# Patient Record
Sex: FEMALE | Race: WHITE | NOT HISPANIC OR LATINO | Employment: FULL TIME | URBAN - NONMETROPOLITAN AREA
[De-identification: names, ages, dates, MRNs, and addresses within clinical notes are randomized per-mention and may not be internally consistent; named-entity substitution may affect disease eponyms.]

---

## 2023-10-21 RX ORDER — PNV NO.95/FERROUS FUM/FOLIC AC 28MG-0.8MG
TABLET ORAL
COMMUNITY

## 2023-10-21 RX ORDER — IBUPROFEN 600 MG/1
600 TABLET ORAL EVERY 6 HOURS
COMMUNITY
End: 2023-10-24 | Stop reason: ALTCHOICE

## 2023-10-21 RX ORDER — NORETHINDRONE 0.35 MG/1
1 TABLET ORAL DAILY
COMMUNITY
Start: 2022-07-08 | End: 2023-10-24 | Stop reason: ALTCHOICE

## 2023-10-21 RX ORDER — DROSPIRENONE AND ETHINYL ESTRADIOL 0.03MG-3MG
1 KIT ORAL DAILY
COMMUNITY
End: 2023-10-24 | Stop reason: ALTCHOICE

## 2023-10-21 RX ORDER — ENOXAPARIN SODIUM 100 MG/ML
40 INJECTION SUBCUTANEOUS DAILY
COMMUNITY
End: 2023-10-24 | Stop reason: ALTCHOICE

## 2023-10-24 ENCOUNTER — OFFICE VISIT (OUTPATIENT)
Dept: OBSTETRICS AND GYNECOLOGY | Facility: CLINIC | Age: 33
End: 2023-10-24
Payer: COMMERCIAL

## 2023-10-24 VITALS
BODY MASS INDEX: 27.57 KG/M2 | HEIGHT: 63 IN | SYSTOLIC BLOOD PRESSURE: 116 MMHG | WEIGHT: 155.6 LBS | DIASTOLIC BLOOD PRESSURE: 64 MMHG

## 2023-10-24 DIAGNOSIS — Z32.01 POSITIVE URINE PREGNANCY TEST (HHS-HCC): ICD-10-CM

## 2023-10-24 DIAGNOSIS — N91.2 AMENORRHEA: Primary | ICD-10-CM

## 2023-10-24 LAB — PREGNANCY TEST URINE, POC: POSITIVE

## 2023-10-24 PROCEDURE — 99213 OFFICE O/P EST LOW 20 MIN: CPT | Performed by: OBSTETRICS & GYNECOLOGY

## 2023-10-24 PROCEDURE — 76817 TRANSVAGINAL US OBSTETRIC: CPT | Performed by: OBSTETRICS & GYNECOLOGY

## 2023-10-24 PROCEDURE — 81025 URINE PREGNANCY TEST: CPT | Performed by: OBSTETRICS & GYNECOLOGY

## 2023-10-24 PROCEDURE — 76801 OB US < 14 WKS SINGLE FETUS: CPT | Performed by: OBSTETRICS & GYNECOLOGY

## 2023-10-24 PROCEDURE — 1036F TOBACCO NON-USER: CPT | Performed by: OBSTETRICS & GYNECOLOGY

## 2023-10-24 NOTE — PROGRESS NOTES
Subjective   Patient ID [id number]   Caro Rogers is a 32 y.o. , who presents for evaluation regarding low menstrual flow since August.  She has some nausea.  Denies any vaginal bleeding or cramps.    Chief Complaint   Patient presents with    Amenorrhea     Patient is here due to amenorrhea. LMP: 2023. Patient c/o nausea. Patient denies any breast tenderness, cramps or vaginal bleeding.        OB History    Para Term  AB Living   1 1 1 0 0 1   SAB IAB Ectopic Multiple Live Births   0 0 0 0 1      # Outcome Date GA Lbr Ky/2nd Weight Sex Delivery Anes PTL Lv   1 Term 22 39w6d  3062 g F Vag-Spont   PIERCE          Review of Systems:   Constitutional: No fever or chills  Respiratory: No shortness of breath, or cough  Cardiovascular: No chest pain or syncope  Breasts: No masses, no nipple discharge  Gastrointestinal: No diarrhea, no abdominal pain  Genitourinary: No dysuria or frequency  Gynecology: Negative except as noted in history of present illness  All other: All other systems reviewed and negative for complaint    Objective   Physical Exam  Weight: 70.6 kg (155 lb 9.6 oz)  Expected Total Weight Gain: Could not be calculated   Pregravid BMI: Could not be calculated  BP: 116/64          OBGyn Exam  PHYSICAL EXAMINATION:  Well-developed, well nourished, in no acute distress, alert and oriented x three, is pleasant and cooperative.  HEENT: Clear. Pupils equal, round and reactive to light and accommodation. Extraocular muscles are intact. Oral mucosa pink without exudate.   NECK: No lymphadenopathy, no thyromegaly.  LUNGS: Clear bilaterally.  HEART: Regular rate and rhythm without murmurs.  ABDOMEN: Normoactive bowel sounds, soft and nontender, no guarding or rebound tenderness, no CVA tenderness.  EXTREMITIES: No clubbing, cyanosis or edema.  NEUROLOGIC:  Cranial nerves II-XII grossly intact.  :  Normal external female genitalia, normal vulva, normal vagina.  Vaginal ultrasound  shows a live intrauterine pregnancy at 8 weeks 4 days gestation which is consistent with LMP. EDC is Marlene 3, 2024.    Assessment/Plan   Problem List Items Addressed This Visit    None  Visit Diagnoses       Amenorrhea    -  Primary    Relevant Orders    US OB < 14 weeks early (Completed)    Positive urine pregnancy test        Relevant Orders    POC pregnancy, urine (Completed)            Follow up in 4 weeks for return new OB visit, cultures and prenatal labs.

## 2023-11-21 ENCOUNTER — INITIAL PRENATAL (OUTPATIENT)
Dept: OBSTETRICS AND GYNECOLOGY | Facility: CLINIC | Age: 33
End: 2023-11-21
Payer: COMMERCIAL

## 2023-11-21 ENCOUNTER — LAB (OUTPATIENT)
Dept: LAB | Facility: LAB | Age: 33
End: 2023-11-21
Payer: COMMERCIAL

## 2023-11-21 VITALS — BODY MASS INDEX: 28.13 KG/M2 | DIASTOLIC BLOOD PRESSURE: 72 MMHG | SYSTOLIC BLOOD PRESSURE: 122 MMHG | WEIGHT: 158.8 LBS

## 2023-11-21 DIAGNOSIS — Z3A.12 12 WEEKS GESTATION OF PREGNANCY (HHS-HCC): ICD-10-CM

## 2023-11-21 DIAGNOSIS — Z11.3 SCREEN FOR STD (SEXUALLY TRANSMITTED DISEASE): ICD-10-CM

## 2023-11-21 LAB
ABO GROUP (TYPE) IN BLOOD: NORMAL
ANTIBODY SCREEN: NORMAL
ERYTHROCYTE [DISTWIDTH] IN BLOOD BY AUTOMATED COUNT: 13.8 % (ref 11.5–14.5)
HCT VFR BLD AUTO: 40.6 % (ref 36–46)
HGB BLD-MCNC: 13.1 G/DL (ref 12–16)
MCH RBC QN AUTO: 28.8 PG (ref 26–34)
MCHC RBC AUTO-ENTMCNC: 32.3 G/DL (ref 32–36)
MCV RBC AUTO: 89 FL (ref 80–100)
NRBC BLD-RTO: 0 /100 WBCS (ref 0–0)
PLATELET # BLD AUTO: 235 X10*3/UL (ref 150–450)
RBC # BLD AUTO: 4.55 X10*6/UL (ref 4–5.2)
RH FACTOR (ANTIGEN D): NORMAL
WBC # BLD AUTO: 6.7 X10*3/UL (ref 4.4–11.3)

## 2023-11-21 PROCEDURE — 87086 URINE CULTURE/COLONY COUNT: CPT

## 2023-11-21 PROCEDURE — 86780 TREPONEMA PALLIDUM: CPT

## 2023-11-21 PROCEDURE — 87389 HIV-1 AG W/HIV-1&-2 AB AG IA: CPT

## 2023-11-21 PROCEDURE — 85027 COMPLETE CBC AUTOMATED: CPT

## 2023-11-21 PROCEDURE — 86900 BLOOD TYPING SEROLOGIC ABO: CPT

## 2023-11-21 PROCEDURE — 87340 HEPATITIS B SURFACE AG IA: CPT

## 2023-11-21 PROCEDURE — 0500F INITIAL PRENATAL CARE VISIT: CPT | Performed by: OBSTETRICS & GYNECOLOGY

## 2023-11-21 PROCEDURE — 36415 COLL VENOUS BLD VENIPUNCTURE: CPT

## 2023-11-21 PROCEDURE — 86850 RBC ANTIBODY SCREEN: CPT

## 2023-11-21 PROCEDURE — 86901 BLOOD TYPING SEROLOGIC RH(D): CPT

## 2023-11-21 PROCEDURE — 86317 IMMUNOASSAY INFECTIOUS AGENT: CPT

## 2023-11-21 PROCEDURE — 87800 DETECT AGNT MULT DNA DIREC: CPT

## 2023-11-21 NOTE — PROGRESS NOTES
Subjective   Patient ID [id number]   Caro Rogers is a 32 y.o.  at 12w1d with a working estimated date of delivery of 6/3/2024, by Last Menstrual Period who presents for an initial prenatal visit.     Chief Complaint   Patient presents with    Routine Prenatal Visit     Patient has no concerns at this time.          OB History    Para Term  AB Living   2 1 1 0 0 1   SAB IAB Ectopic Multiple Live Births   0 0 0 0 1      # Outcome Date GA Lbr Ky/2nd Weight Sex Delivery Anes PTL Lv   2 Current            1 Term 22 39w6d  3062 g F Vag-Spont   PIERCE          Review of Systems:   Constitutional: No fever or chills  Respiratory: No shortness of breath, or cough  Cardiovascular: No chest pain or syncope  Breasts: No masses, no nipple discharge  Gastrointestinal: No diarrhea, no abdominal pain  Genitourinary: No dysuria or frequency  Gynecology: Negative except as noted in history of present illness  All other: All other systems reviewed and negative for complaint    Objective   Physical Exam  Weight: 72 kg (158 lb 12.8 oz)  Expected Total Weight Gain: 7 kg (15 lb)-11.5 kg (25 lb)   Pregravid BMI: 27.46  BP: 122/72          OBGyn Exam  PHYSICAL EXAMINATION:  Well-developed, well nourished, in no acute distress, alert and oriented x three, is pleasant and cooperative.  HEENT: Clear. Pupils equal, round and reactive to light and accommodation. Extraocular muscles are intact. Oral mucosa pink without exudate.   NECK: No lymphadenopathy, no thyromegaly.  LUNGS: Clear bilaterally.  HEART: Regular rate and rhythm without murmurs.  ABDOMEN: Normoactive bowel sounds, soft and nontender, no guarding or rebound tenderness, no CVA tenderness.  EXTREMITIES: No clubbing, cyanosis or edema.  NEUROLOGIC:  Cranial nerves II-XII grossly intact.      Assessment/Plan   Problem List Items Addressed This Visit    None  Visit Diagnoses       12 weeks gestation of pregnancy        Relevant Orders    Urine Culture     CBC Anemia Panel With Reflex,Pregnancy    Syphilis Screen with Reflex    Hepatitis B Surface Antigen    HIV 1/2 Antigen/Antibody Screen with Reflex to Confirmation    Rubella Antibody, IgG    Type And Screen    Screen for STD (sexually transmitted disease)        Relevant Orders    C. Trachomatis / N. Gonorrhoeae, Amplified Detection            Prenatal Labs ordered    Follow up in 4 weeks for return OB visit.

## 2023-11-22 LAB
C TRACH RRNA SPEC QL NAA+PROBE: NEGATIVE
HBV SURFACE AG SERPL QL IA: NONREACTIVE
HIV 1+2 AB+HIV1 P24 AG SERPL QL IA: NONREACTIVE
N GONORRHOEA DNA SPEC QL PROBE+SIG AMP: NEGATIVE
REFLEX ADDED, ANEMIA PANEL: NORMAL
RUBV IGG SERPL IA-ACNC: 1.7 IA
RUBV IGG SERPL QL IA: POSITIVE
T PALLIDUM AB SER QL: NONREACTIVE

## 2023-11-23 LAB — BACTERIA UR CULT: NO GROWTH

## 2023-12-19 ENCOUNTER — ROUTINE PRENATAL (OUTPATIENT)
Dept: OBSTETRICS AND GYNECOLOGY | Facility: CLINIC | Age: 33
End: 2023-12-19
Payer: COMMERCIAL

## 2023-12-19 VITALS — SYSTOLIC BLOOD PRESSURE: 116 MMHG | DIASTOLIC BLOOD PRESSURE: 68 MMHG | BODY MASS INDEX: 29.19 KG/M2 | WEIGHT: 164.8 LBS

## 2023-12-19 DIAGNOSIS — Z3A.16 16 WEEKS GESTATION OF PREGNANCY (HHS-HCC): Primary | ICD-10-CM

## 2023-12-19 PROCEDURE — 0501F PRENATAL FLOW SHEET: CPT | Performed by: OBSTETRICS & GYNECOLOGY

## 2023-12-19 NOTE — PROGRESS NOTES
Subjective   Patient ID [id number]   Caro Rogers is a 32 y.o.  at 16w1d with a working estimated date of delivery of 6/3/2024, by Last Menstrual Period who presents for a routine prenatal visit. She denies vaginal bleeding, leakage of fluid, decreased fetal movements, or contractions.    Chief Complaint   Patient presents with    Routine Prenatal Visit     Patient has no concerns at this time. Urine is negative for glucose and protein. Patient declines quad screen.          Objective   Physical Exam  Weight: 74.8 kg (164 lb 12.8 oz)  Expected Total Weight Gain: 7 kg (15 lb)-11.5 kg (25 lb)   Pregravid BMI: 27.46  BP: 116/68  Fetal Heart Rate: 150 Fundal Height (cm): 16 cm    Prenatal Labs  Lab Results   Component Value Date    HGB 13.1 2023    HCT 40.6 2023    ABO O 2023    HEPBSAG Nonreactive 2023       Assessment/Plan   Problem List Items Addressed This Visit    None  Visit Diagnoses       16 weeks gestation of pregnancy    -  Primary    Relevant Orders    US OB 14+ weeks anatomy scan            Continue prenatal vitamin.  Labs reviewed.    Declines quad screen.  Follow up in 4 weeks for a routine prenatal visit.

## 2024-01-16 ENCOUNTER — ROUTINE PRENATAL (OUTPATIENT)
Dept: OBSTETRICS AND GYNECOLOGY | Facility: CLINIC | Age: 34
End: 2024-01-16
Payer: COMMERCIAL

## 2024-01-16 ENCOUNTER — HOSPITAL ENCOUNTER (OUTPATIENT)
Dept: RADIOLOGY | Facility: HOSPITAL | Age: 34
Discharge: HOME | End: 2024-01-16
Payer: COMMERCIAL

## 2024-01-16 VITALS — DIASTOLIC BLOOD PRESSURE: 66 MMHG | BODY MASS INDEX: 30.5 KG/M2 | SYSTOLIC BLOOD PRESSURE: 112 MMHG | WEIGHT: 172.2 LBS

## 2024-01-16 DIAGNOSIS — Z3A.16 16 WEEKS GESTATION OF PREGNANCY (HHS-HCC): ICD-10-CM

## 2024-01-16 DIAGNOSIS — Z3A.20 20 WEEKS GESTATION OF PREGNANCY (HHS-HCC): Primary | ICD-10-CM

## 2024-01-16 PROCEDURE — 76805 OB US >/= 14 WKS SNGL FETUS: CPT

## 2024-01-16 PROCEDURE — 0501F PRENATAL FLOW SHEET: CPT | Performed by: OBSTETRICS & GYNECOLOGY

## 2024-01-16 PROCEDURE — 76815 OB US LIMITED FETUS(S): CPT | Performed by: RADIOLOGY

## 2024-01-16 NOTE — PROGRESS NOTES
Subjective   Patient ID [id number]   Caro Rogers is a 33 y.o.  at 20w1d with a working estimated date of delivery of 6/3/2024, by Last Menstrual Period who presents for a routine prenatal visit. She denies vaginal bleeding, leakage of fluid, decreased fetal movements, or contractions.    Chief Complaint   Patient presents with    Routine Prenatal Visit     Patient has no concerns at this time. Urine is negative for glucose and protein.          Objective   Physical Exam  Weight: 78.1 kg (172 lb 3.2 oz)  Expected Total Weight Gain: 7 kg (15 lb)-11.5 kg (25 lb)   Pregravid BMI: 27.46  BP: 112/66  Fetal Heart Rate: 150 Fundal Height (cm): 20 cm    Prenatal Labs    Lab Results   Component Value Date    HGB 13.1 2023    HCT 40.6 2023    ABO O 2023    HEPBSAG Nonreactive 2023       Assessment/Plan   Problem List Items Addressed This Visit    None  Visit Diagnoses       20 weeks gestation of pregnancy    -  Primary            Continue prenatal vitamin.  Labs reviewed.    Follow up in 4 weeks for a routine prenatal visit.

## 2024-02-13 ENCOUNTER — ROUTINE PRENATAL (OUTPATIENT)
Dept: OBSTETRICS AND GYNECOLOGY | Facility: CLINIC | Age: 34
End: 2024-02-13
Payer: COMMERCIAL

## 2024-02-13 VITALS — DIASTOLIC BLOOD PRESSURE: 66 MMHG | SYSTOLIC BLOOD PRESSURE: 114 MMHG | BODY MASS INDEX: 31.57 KG/M2 | WEIGHT: 178.2 LBS

## 2024-02-13 DIAGNOSIS — Z3A.23 23 WEEKS GESTATION OF PREGNANCY (HHS-HCC): Primary | ICD-10-CM

## 2024-02-13 PROCEDURE — 0501F PRENATAL FLOW SHEET: CPT | Performed by: OBSTETRICS & GYNECOLOGY

## 2024-02-13 NOTE — PROGRESS NOTES
Subjective   Patient ID [id number]   Caro Rogers is a 33 y.o.  at 24w1d with a working estimated date of delivery of 6/3/2024, by Last Menstrual Period who presents for a routine prenatal visit. She denies vaginal bleeding, leakage of fluid, decreased fetal movements, or contractions.    Chief Complaint   Patient presents with    Routine Prenatal Visit     Patient has no concerns at this time. 1 hour GTT info given. Urine is negative for glucose and protein.          Objective   Physical Exam  Weight: 80.8 kg (178 lb 3.2 oz)  Expected Total Weight Gain: 7 kg (15 lb)-11.5 kg (25 lb)   Pregravid BMI: 27.46  BP: 114/66  Fetal Heart Rate: 146 Fundal Height (cm): 24 cm    Prenatal Labs    Lab Results   Component Value Date    HGB 13.1 2023    HCT 40.6 2023    ABO O 2023    HEPBSAG Nonreactive 2023       Assessment/Plan       Continue prenatal vitamin.  Labs reviewed.    Follow up in 4 weeks for a routine prenatal visit.

## 2024-03-12 ENCOUNTER — LAB (OUTPATIENT)
Dept: LAB | Facility: LAB | Age: 34
End: 2024-03-12
Payer: COMMERCIAL

## 2024-03-12 ENCOUNTER — ROUTINE PRENATAL (OUTPATIENT)
Dept: OBSTETRICS AND GYNECOLOGY | Facility: CLINIC | Age: 34
End: 2024-03-12
Payer: COMMERCIAL

## 2024-03-12 VITALS — SYSTOLIC BLOOD PRESSURE: 110 MMHG | WEIGHT: 180.8 LBS | BODY MASS INDEX: 32.03 KG/M2 | DIASTOLIC BLOOD PRESSURE: 64 MMHG

## 2024-03-12 DIAGNOSIS — O26.899 RH NEGATIVE STATE IN ANTEPARTUM PERIOD (HHS-HCC): ICD-10-CM

## 2024-03-12 DIAGNOSIS — Z67.91 RH NEGATIVE STATE IN ANTEPARTUM PERIOD (HHS-HCC): ICD-10-CM

## 2024-03-12 DIAGNOSIS — Z3A.28 28 WEEKS GESTATION OF PREGNANCY (HHS-HCC): Primary | ICD-10-CM

## 2024-03-12 DIAGNOSIS — Z3A.23 23 WEEKS GESTATION OF PREGNANCY (HHS-HCC): ICD-10-CM

## 2024-03-12 LAB
ABO GROUP (TYPE) IN BLOOD: NORMAL
ANTIBODY SCREEN: NORMAL
ERYTHROCYTE [DISTWIDTH] IN BLOOD BY AUTOMATED COUNT: 12.2 % (ref 11.5–14.5)
GLUCOSE 1H P 50 G GLC PO SERPL-MCNC: 92 MG/DL
HCT VFR BLD AUTO: 34 % (ref 36–46)
HGB BLD-MCNC: 11.2 G/DL (ref 12–16)
MCH RBC QN AUTO: 28.9 PG (ref 26–34)
MCHC RBC AUTO-ENTMCNC: 32.9 G/DL (ref 32–36)
MCV RBC AUTO: 88 FL (ref 80–100)
NRBC BLD-RTO: 0 /100 WBCS (ref 0–0)
PLATELET # BLD AUTO: 187 X10*3/UL (ref 150–450)
RBC # BLD AUTO: 3.88 X10*6/UL (ref 4–5.2)
REFLEX ADDED, ANEMIA PANEL: NORMAL
RH FACTOR (ANTIGEN D): NORMAL
WBC # BLD AUTO: 6.4 X10*3/UL (ref 4.4–11.3)

## 2024-03-12 PROCEDURE — 82947 ASSAY GLUCOSE BLOOD QUANT: CPT

## 2024-03-12 PROCEDURE — 85027 COMPLETE CBC AUTOMATED: CPT

## 2024-03-12 PROCEDURE — 36415 COLL VENOUS BLD VENIPUNCTURE: CPT

## 2024-03-12 PROCEDURE — 96372 THER/PROPH/DIAG INJ SC/IM: CPT | Performed by: OBSTETRICS & GYNECOLOGY

## 2024-03-12 PROCEDURE — 0501F PRENATAL FLOW SHEET: CPT | Performed by: OBSTETRICS & GYNECOLOGY

## 2024-03-12 PROCEDURE — 86900 BLOOD TYPING SEROLOGIC ABO: CPT

## 2024-03-12 PROCEDURE — 86901 BLOOD TYPING SEROLOGIC RH(D): CPT

## 2024-03-12 PROCEDURE — 86850 RBC ANTIBODY SCREEN: CPT

## 2024-03-26 ENCOUNTER — ROUTINE PRENATAL (OUTPATIENT)
Dept: OBSTETRICS AND GYNECOLOGY | Facility: CLINIC | Age: 34
End: 2024-03-26
Payer: COMMERCIAL

## 2024-03-26 VITALS — DIASTOLIC BLOOD PRESSURE: 64 MMHG | WEIGHT: 182.6 LBS | BODY MASS INDEX: 32.35 KG/M2 | SYSTOLIC BLOOD PRESSURE: 116 MMHG

## 2024-03-26 DIAGNOSIS — Z3A.30 30 WEEKS GESTATION OF PREGNANCY (HHS-HCC): Primary | ICD-10-CM

## 2024-03-26 PROCEDURE — 0501F PRENATAL FLOW SHEET: CPT | Performed by: OBSTETRICS & GYNECOLOGY

## 2024-03-26 NOTE — PROGRESS NOTES
Subjective   Patient ID [id number]   Caro Rogers is a 33 y.o.  at 30w1d with a working estimated date of delivery of 6/3/2024, by Last Menstrual Period who presents for a routine prenatal visit. She denies vaginal bleeding, leakage of fluid, decreased fetal movements, or contractions.    Chief Complaint   Patient presents with    Routine Prenatal Visit     Patient has no concerns at this time. Urine is negative for glucose and protein.          Objective   Physical Exam  Weight: 82.8 kg (182 lb 9.6 oz)  Expected Total Weight Gain: 7 kg (15 lb)-11.5 kg (25 lb)   Pregravid BMI: 27.46  BP: 116/64  Fetal Heart Rate: 146 Fundal Height (cm): 30 cm    Prenatal Labs    Lab Results   Component Value Date    HGB 11.2 (L) 2024    HCT 34.0 (L) 2024    ABO O 2024    HEPBSAG Nonreactive 2023       Assessment/Plan   Problem List Items Addressed This Visit    None  Visit Diagnoses       30 weeks gestation of pregnancy    -  Primary            Continue prenatal vitamin.  Labs reviewed.    Follow up in 2 weeks for a routine prenatal visit.

## 2024-04-09 ENCOUNTER — ROUTINE PRENATAL (OUTPATIENT)
Dept: OBSTETRICS AND GYNECOLOGY | Facility: CLINIC | Age: 34
End: 2024-04-09
Payer: COMMERCIAL

## 2024-04-09 VITALS — BODY MASS INDEX: 32.66 KG/M2 | DIASTOLIC BLOOD PRESSURE: 68 MMHG | WEIGHT: 184.4 LBS | SYSTOLIC BLOOD PRESSURE: 110 MMHG

## 2024-04-09 DIAGNOSIS — Z3A.32 32 WEEKS GESTATION OF PREGNANCY (HHS-HCC): Primary | ICD-10-CM

## 2024-04-09 PROCEDURE — 0501F PRENATAL FLOW SHEET: CPT | Performed by: OBSTETRICS & GYNECOLOGY

## 2024-04-09 NOTE — PROGRESS NOTES
Subjective   Patient ID [id number]   Caro Rogers is a 33 y.o.  at 32w1d with a working estimated date of delivery of 6/3/2024, by Last Menstrual Period who presents for a routine prenatal visit. She denies vaginal bleeding, leakage of fluid, decreased fetal movements, or contractions.    Chief Complaint   Patient presents with    Routine Prenatal Visit     Patient has no concerns at this time. Urine is negative for glucose and protein.        Objective   Physical Exam  Weight: 83.6 kg (184 lb 6.4 oz)  Expected Total Weight Gain: 7 kg (15 lb)-11.5 kg (25 lb)   Pregravid BMI: 27.46  BP: 110/68  Fetal Heart Rate: 140 Fundal Height (cm): 32 cm    Prenatal Labs    Lab Results   Component Value Date    HGB 11.2 (L) 2024    HCT 34.0 (L) 2024    ABO O 2024    HEPBSAG Nonreactive 2023       Assessment/Plan   Problem List Items Addressed This Visit    None  Visit Diagnoses       32 weeks gestation of pregnancy    -  Primary            Continue prenatal vitamin.  Labs reviewed.    Follow up in 2 weeks for a routine prenatal visit.

## 2024-04-23 ENCOUNTER — ROUTINE PRENATAL (OUTPATIENT)
Dept: OBSTETRICS AND GYNECOLOGY | Facility: CLINIC | Age: 34
End: 2024-04-23
Payer: COMMERCIAL

## 2024-04-23 VITALS — BODY MASS INDEX: 32.88 KG/M2 | WEIGHT: 185.6 LBS | DIASTOLIC BLOOD PRESSURE: 64 MMHG | SYSTOLIC BLOOD PRESSURE: 116 MMHG

## 2024-04-23 DIAGNOSIS — Z3A.34 34 WEEKS GESTATION OF PREGNANCY (HHS-HCC): Primary | ICD-10-CM

## 2024-04-23 PROCEDURE — 0501F PRENATAL FLOW SHEET: CPT | Performed by: OBSTETRICS & GYNECOLOGY

## 2024-04-23 NOTE — PROGRESS NOTES
Subjective   Patient ID [id number]   Caro Rogers is a 33 y.o.  at 34w1d with a working estimated date of delivery of 6/3/2024, by Last Menstrual Period who presents for a routine prenatal visit. She denies vaginal bleeding, leakage of fluid, decreased fetal movements, or contractions.    Chief Complaint   Patient presents with    Routine Prenatal Visit     Patient has no concerns at this time. Patient is requesting a note for work to cut her hour to 20-30 hours/week. Urine is negative for glucose and protein.          Objective   Physical Exam  Weight: 84.2 kg (185 lb 9.6 oz)  Expected Total Weight Gain: 7 kg (15 lb)-11.5 kg (25 lb)   Pregravid BMI: 27.46  BP: 116/64  Fetal Heart Rate: 144 Fundal Height (cm): 34 cm    Prenatal Labs    Lab Results   Component Value Date    HGB 11.2 (L) 2024    HCT 34.0 (L) 2024    ABO O 2024    HEPBSAG Nonreactive 2023       Assessment/Plan   Problem List Items Addressed This Visit    None  Visit Diagnoses       34 weeks gestation of pregnancy ([facility])    -  Primary            Continue prenatal vitamin.  Labs reviewed.    Follow up in 2 weeks for a routine prenatal visit.

## 2024-04-23 NOTE — LETTER
April 23, 2024     Patient: Caro Rogers   YOB: 1990   Date of Visit: 4/23/2024       To Whom It May Concern:    It is my medical opinion that Caro Rogers should work 20 to 30 hours for the remainder of her pregnancy.    If you have any questions or concerns, please don't hesitate to call.         Sincerely,        Johan Sykes MD    CC: No Recipients

## 2024-05-07 ENCOUNTER — ROUTINE PRENATAL (OUTPATIENT)
Dept: OBSTETRICS AND GYNECOLOGY | Facility: CLINIC | Age: 34
End: 2024-05-07
Payer: COMMERCIAL

## 2024-05-07 VITALS — SYSTOLIC BLOOD PRESSURE: 112 MMHG | BODY MASS INDEX: 32.49 KG/M2 | WEIGHT: 183.4 LBS | DIASTOLIC BLOOD PRESSURE: 66 MMHG

## 2024-05-07 DIAGNOSIS — Z3A.36 36 WEEKS GESTATION OF PREGNANCY (HHS-HCC): Primary | ICD-10-CM

## 2024-05-07 PROCEDURE — 87081 CULTURE SCREEN ONLY: CPT

## 2024-05-07 PROCEDURE — 0501F PRENATAL FLOW SHEET: CPT | Performed by: OBSTETRICS & GYNECOLOGY

## 2024-05-07 NOTE — PROGRESS NOTES
Subjective   Patient ID [id number]   Caro Rogers is a 33 y.o.  at 36w1d with a working estimated date of delivery of 6/3/2024, by Last Menstrual Period who presents for a routine prenatal visit. She denies vaginal bleeding, leakage of fluid, decreased fetal movements, or contractions.    Chief Complaint   Patient presents with    Routine Prenatal Visit     Patient is here for a parental visit. Patient has no concerns. Glucose is negative Protein is 30 MG  in urine. GBS needed today.             Objective   Physical Exam  Weight: 83.2 kg (183 lb 6.4 oz)  Expected Total Weight Gain: 7 kg (15 lb)-11.5 kg (25 lb)   Pregravid BMI: 27.46  BP: 112/66  Fetal Heart Rate: 144 Fundal Height (cm): 35 cm    Prenatal Labs    Lab Results   Component Value Date    HGB 11.2 (L) 2024    HCT 34.0 (L) 2024    ABO O 2024    HEPBSAG Nonreactive 2023       Assessment/Plan   Problem List Items Addressed This Visit    None  Visit Diagnoses       36 weeks gestation of pregnancy ([facility])    -  Primary    Relevant Orders    Group B Streptococcus (GBS) Prenatal Screen, Culture    US OB follow UP transabdominal approach            Continue prenatal vitamin.  Labs reviewed.    Group B strep obtained today.  Ultrasound in 2 weeks for growth.  Follow up in 1 week for a routine prenatal visit.

## 2024-05-10 LAB — GP B STREP GENITAL QL CULT: NORMAL

## 2024-05-14 ENCOUNTER — ROUTINE PRENATAL (OUTPATIENT)
Dept: OBSTETRICS AND GYNECOLOGY | Facility: CLINIC | Age: 34
End: 2024-05-14
Payer: COMMERCIAL

## 2024-05-14 VITALS — WEIGHT: 185.2 LBS | SYSTOLIC BLOOD PRESSURE: 118 MMHG | BODY MASS INDEX: 32.81 KG/M2 | DIASTOLIC BLOOD PRESSURE: 70 MMHG

## 2024-05-14 DIAGNOSIS — Z3A.37 37 WEEKS GESTATION OF PREGNANCY (HHS-HCC): Primary | ICD-10-CM

## 2024-05-14 PROCEDURE — 0501F PRENATAL FLOW SHEET: CPT | Performed by: OBSTETRICS & GYNECOLOGY

## 2024-05-14 NOTE — PROGRESS NOTES
Subjective   Patient ID [id number]   Caro Rogers is a 33 y.o.  at 37w1d with a working estimated date of delivery of 6/3/2024, by Last Menstrual Period who presents for a routine prenatal visit. She denies vaginal bleeding, leakage of fluid, decreased fetal movements, or contractions.    Chief Complaint   Patient presents with    Routine Prenatal Visit     Patient has no concerns at this time. Patient c/o being tired. Urine is negative for glucose and protein.          Objective   Physical Exam  Weight: 84 kg (185 lb 3.2 oz)  Expected Total Weight Gain: 7 kg (15 lb)-11.5 kg (25 lb)   Pregravid BMI: 27.46  BP: 118/70  Fetal Heart Rate: 142 Fundal Height (cm): 35 cm    Prenatal Labs    Lab Results   Component Value Date    HGB 11.2 (L) 2024    HCT 34.0 (L) 2024    ABO O 2024    HEPBSAG Nonreactive 2023       Assessment/Plan   Problem List Items Addressed This Visit    None  Visit Diagnoses       37 weeks gestation of pregnancy ([facility])    -  Primary            Continue prenatal vitamin.  Labs reviewed.      Follow up in 1 week for a routine prenatal visit.

## 2024-05-21 ENCOUNTER — ROUTINE PRENATAL (OUTPATIENT)
Dept: OBSTETRICS AND GYNECOLOGY | Facility: CLINIC | Age: 34
End: 2024-05-21
Payer: COMMERCIAL

## 2024-05-21 ENCOUNTER — HOSPITAL ENCOUNTER (OUTPATIENT)
Dept: RADIOLOGY | Facility: HOSPITAL | Age: 34
Discharge: HOME | End: 2024-05-21
Payer: COMMERCIAL

## 2024-05-21 VITALS — SYSTOLIC BLOOD PRESSURE: 110 MMHG | DIASTOLIC BLOOD PRESSURE: 72 MMHG | WEIGHT: 185.8 LBS | BODY MASS INDEX: 32.91 KG/M2

## 2024-05-21 DIAGNOSIS — Z3A.38 38 WEEKS GESTATION OF PREGNANCY (HHS-HCC): Primary | ICD-10-CM

## 2024-05-21 DIAGNOSIS — Z3A.36 36 WEEKS GESTATION OF PREGNANCY (HHS-HCC): ICD-10-CM

## 2024-05-21 PROCEDURE — 76816 OB US FOLLOW-UP PER FETUS: CPT

## 2024-05-21 PROCEDURE — 0501F PRENATAL FLOW SHEET: CPT | Performed by: OBSTETRICS & GYNECOLOGY

## 2024-05-21 NOTE — PROGRESS NOTES
Subjective   Patient ID [id number]   Caro Rogers is a 33 y.o.  at 38w1d with a working estimated date of delivery of 6/3/2024, by Last Menstrual Period who presents for a routine prenatal visit. She denies vaginal bleeding, leakage of fluid, decreased fetal movements, or contractions.    Chief Complaint   Patient presents with    Routine Prenatal Visit     Patient has no concerns at this time. Patient does c/o groin pain. Urine is negative for glucose and has 30mg/dL of protein.          Objective   Physical Exam  Weight: 84.3 kg (185 lb 12.8 oz)  Expected Total Weight Gain: 7 kg (15 lb)-11.5 kg (25 lb)   Pregravid BMI: 27.46  BP: 110/72  Fetal Heart Rate: 144 Fundal Height (cm): 36 cm    Prenatal Labs    Lab Results   Component Value Date    HGB 11.2 (L) 2024    HCT 34.0 (L) 2024    ABO O 2024    HEPBSAG Nonreactive 2023       Assessment/Plan   Problem List Items Addressed This Visit    None  Visit Diagnoses       38 weeks gestation of pregnancy ([facility])    -  Primary            Continue prenatal vitamin.  Labs reviewed.    Follow up in 1 week for a routine prenatal visit.

## 2024-05-28 ENCOUNTER — ROUTINE PRENATAL (OUTPATIENT)
Dept: OBSTETRICS AND GYNECOLOGY | Facility: CLINIC | Age: 34
End: 2024-05-28
Payer: COMMERCIAL

## 2024-05-28 VITALS — WEIGHT: 186 LBS | BODY MASS INDEX: 32.95 KG/M2 | DIASTOLIC BLOOD PRESSURE: 74 MMHG | SYSTOLIC BLOOD PRESSURE: 104 MMHG

## 2024-05-28 DIAGNOSIS — Z3A.39 39 WEEKS GESTATION OF PREGNANCY (HHS-HCC): Primary | ICD-10-CM

## 2024-05-28 PROCEDURE — 0501F PRENATAL FLOW SHEET: CPT | Performed by: OBSTETRICS & GYNECOLOGY

## 2024-05-28 NOTE — PROGRESS NOTES
Subjective   Patient ID [id number]   Caro Rogers is a 33 y.o.  at 39w1d with a working estimated date of delivery of 6/3/2024, by Last Menstrual Period who presents for a routine prenatal visit. She denies vaginal bleeding, leakage of fluid, decreased fetal movements, or contractions.    Chief Complaint   Patient presents with    Routine Prenatal Visit     Patient here for an OB visit. Patient would like to get induced scheduled for . Patient has no concerns           Objective   Physical Exam  Weight: 84.4 kg (186 lb)  Expected Total Weight Gain: 7 kg (15 lb)-11.5 kg (25 lb)   Pregravid BMI: 27.46  BP: 104/74  Fetal Heart Rate: 142 Fundal Height (cm): 36 cm    Prenatal Labs    Lab Results   Component Value Date    HGB 11.2 (L) 2024    HCT 34.0 (L) 2024    ABO O 2024    HEPBSAG Nonreactive 2023       Assessment/Plan   Problem List Items Addressed This Visit    None  Visit Diagnoses       39 weeks gestation of pregnancy ([facility])    -  Primary            Continue prenatal vitamin.  Labs reviewed.    Induction scheduled for Marlene 3.  Follow up in 4 weeks for postpartum visit.

## 2024-07-01 ENCOUNTER — APPOINTMENT (OUTPATIENT)
Dept: OBSTETRICS AND GYNECOLOGY | Facility: CLINIC | Age: 34
End: 2024-07-01
Payer: COMMERCIAL

## 2024-07-01 VITALS
DIASTOLIC BLOOD PRESSURE: 68 MMHG | BODY MASS INDEX: 29.98 KG/M2 | WEIGHT: 169.2 LBS | HEIGHT: 63 IN | SYSTOLIC BLOOD PRESSURE: 106 MMHG

## 2024-07-01 ASSESSMENT — EDINBURGH POSTNATAL DEPRESSION SCALE (EPDS)
TOTAL SCORE: 0
I HAVE BEEN ABLE TO LAUGH AND SEE THE FUNNY SIDE OF THINGS: AS MUCH AS I ALWAYS COULD
I HAVE BLAMED MYSELF UNNECESSARILY WHEN THINGS WENT WRONG: NO, NEVER
I HAVE BEEN SO UNHAPPY THAT I HAVE HAD DIFFICULTY SLEEPING: NOT AT ALL
I HAVE BEEN SO UNHAPPY THAT I HAVE BEEN CRYING: NO, NEVER
THINGS HAVE BEEN GETTING ON TOP OF ME: NO, I HAVE BEEN COPING AS WELL AS EVER
I HAVE LOOKED FORWARD WITH ENJOYMENT TO THINGS: AS MUCH AS I EVER DID
I HAVE BEEN ANXIOUS OR WORRIED FOR NO GOOD REASON: NO, NOT AT ALL
I HAVE FELT SAD OR MISERABLE: NO, NOT AT ALL
I HAVE FELT SCARED OR PANICKY FOR NO GOOD REASON: NO, NOT AT ALL
THE THOUGHT OF HARMING MYSELF HAS OCCURRED TO ME: NEVER

## 2024-07-01 ASSESSMENT — PAIN SCALES - GENERAL: PAINLEVEL: 0-NO PAIN

## 2024-07-01 NOTE — LETTER
July 1, 2024     Patient: Caro Rogers   YOB: 1990   Date of Visit: 7/1/2024       To Whom It May Concern:    It is my medical opinion that Caro Rogers should work part time for the next month.    If you have any questions or concerns, please don't hesitate to call.         Sincerely,        Johan Sykes MD    CC: No Recipients

## 2024-07-01 NOTE — PROGRESS NOTES
Caro Rogers is a 33 y.o. year old female patient.  PCP = Skyla Rdz MD    Chief Complaint   Patient presents with    Postpartum Care     Patient is here for her 4 week post partum visit. Patient is breastfeeding. Patient does not wish to discuss birth control options at this time and states she has resumed sexual activity and is using condoms. Patient denies any post partum depression and has no concerns at this time.        HPI   Presents for postpartum checkup. She voices no complaints and is doing well. Denies any bowel or bladder problems. Denies any breast problems.  She is using condoms for contraception.  She is breast-feeding.    OB History          2    Para   2    Term   2       0    AB   0    Living   2         SAB   0    IAB   0    Ectopic   0    Multiple        Live Births   2                 Past Medical History:   Diagnosis Date    Encounter for full-term uncomplicated delivery ([facility])     Normal vaginal delivery    Other conditions influencing health status     Menstruation    Pap test, as part of routine gynecological examination 2022    Reflex Negative       Past Surgical History:   Procedure Laterality Date    FOOT SURGERY      TONSILLECTOMY         Review of Systems:   Constitutional: No fever or chills  Respiratory: No shortness of breath, or cough  Cardiovascular: No chest pain or syncope  Breasts: No breast pain, no masses, no nipple discharge  Gastrointestinal: No nausea, vomiting, or diarrhea, no abdominal pain  Genitourinary: No dysuria or frequency  Gynecology: Negative except as noted in history of present illness  All other: All other systems reviewed and negative for complaint    Medication Documentation Review Audit       Reviewed by Johan Sykes MD (Physician) on 24 at 1403      Medication Order Taking? Sig Documenting Provider Last Dose Status   acetaminophen (TYLENOL ORAL) [id number]  Take 975 mg by mouth every 6 hours. Historical  "Provider, MD  Active   prenatal vit no.124-iron-folic (Prenatal Vitamin) 27 mg iron- 800 mcg tablet [id number]  Prenatal Vitamin 27-0.8 MG Oral Tablet   Refills: 0       Active Historical Provider, MD  Active                     /68   Ht 1.6 m (5' 3\")   Wt 76.7 kg (169 lb 3.2 oz)   Breastfeeding Yes   BMI 29.97 kg/m²     PHYSICAL EXAMINATION:  Well-developed, well nourished, in no acute distress, alert and oriented x three, is pleasant and cooperative.   HEENT: Clear. Pupils equal, round and reactive to light and accommodation. Extraocular muscles are intact. Oral mucosa pink without exudate.   NECK: No lymphadenopathy, no thyromegaly.  LUNGS: Clear bilaterally.  HEART: Regular rate and rhythm without murmurs.  ABDOMEN: Normoactive bowel sounds, soft and nontender, no guarding or rebound tenderness, no CVA tenderness.  EXTREMITIES: No clubbing, cyanosis or edema.  NEUROLOGIC:  Cranial nerves II-XII grossly intact.  :  Normal external female genitalia, normal vulva, normal vagina. Normal urethral meatus, urethra and bladder. Normal appearing cervix. Normal-sized uterus, no adnexal masses or tenderness.    Lab Results   Component Value Date    WBC 6.4 03/12/2024    HGB 11.2 (L) 03/12/2024    HCT 34.0 (L) 03/12/2024     03/12/2024    ALT 11 02/15/2023    AST 14 02/15/2023     02/15/2023    K 3.8 02/15/2023     02/15/2023    CREATININE 0.54 02/15/2023    BUN 12 02/15/2023    CO2 25 02/15/2023    INR 1.0 02/15/2023           Problem List Items Addressed This Visit    None  Visit Diagnoses       Routine postpartum follow-up ([facility])    -  Primary             Provider Impression:  1.  Postpartum checkup      Thank you for coming to your postpartum checkup. Your findings during the exam were normal.  Please return for your next visit in 2 months for annual exam.  "

## 2024-09-10 ENCOUNTER — APPOINTMENT (OUTPATIENT)
Dept: OBSTETRICS AND GYNECOLOGY | Facility: CLINIC | Age: 34
End: 2024-09-10
Payer: COMMERCIAL

## 2024-09-10 VITALS
HEIGHT: 63 IN | SYSTOLIC BLOOD PRESSURE: 110 MMHG | DIASTOLIC BLOOD PRESSURE: 72 MMHG | WEIGHT: 157.6 LBS | BODY MASS INDEX: 27.93 KG/M2

## 2024-09-10 DIAGNOSIS — Z12.4 ENCOUNTER FOR SCREENING FOR CERVICAL CANCER: ICD-10-CM

## 2024-09-10 DIAGNOSIS — Z01.419 ENCOUNTER FOR GYNECOLOGICAL EXAMINATION WITHOUT ABNORMAL FINDING: ICD-10-CM

## 2024-09-10 PROCEDURE — 1036F TOBACCO NON-USER: CPT | Performed by: OBSTETRICS & GYNECOLOGY

## 2024-09-10 PROCEDURE — 3008F BODY MASS INDEX DOCD: CPT | Performed by: OBSTETRICS & GYNECOLOGY

## 2024-09-10 PROCEDURE — 88175 CYTOPATH C/V AUTO FLUID REDO: CPT

## 2024-09-10 PROCEDURE — 99459 PELVIC EXAMINATION: CPT | Performed by: OBSTETRICS & GYNECOLOGY

## 2024-09-10 PROCEDURE — 99395 PREV VISIT EST AGE 18-39: CPT | Performed by: OBSTETRICS & GYNECOLOGY

## 2024-09-10 ASSESSMENT — PATIENT HEALTH QUESTIONNAIRE - PHQ9
1. LITTLE INTEREST OR PLEASURE IN DOING THINGS: NOT AT ALL
SUM OF ALL RESPONSES TO PHQ9 QUESTIONS 1 AND 2: 0
2. FEELING DOWN, DEPRESSED OR HOPELESS: NOT AT ALL

## 2024-09-10 ASSESSMENT — PAIN SCALES - GENERAL: PAINLEVEL: 0-NO PAIN

## 2024-09-10 NOTE — PROGRESS NOTES
"Caro Rogers is a 33 y.o. female who is here for a routine exam. PCP = Skyla Rdz MD    Chief Complaint   Patient presents with    Gynecologic Exam     Patient is here for yearly exam and pap test. Patient does do regular self breast exams and has no concerns at this time. LMP: Breastfeeding.          Presents for annual exam. She voices no complaints and is doing well. Denies any bowel or bladder problems. Denies any breast problems.  She is breast-feeding and using condoms for contraception.    OB History          2    Para   2    Term   2       0    AB   0    Living   2         SAB   0    IAB   0    Ectopic   0    Multiple        Live Births   2                  Social History     Substance and Sexual Activity   Sexual Activity Yes     Current contraception: Condoms    Past Medical History:   Diagnosis Date    Encounter for full-term uncomplicated delivery ([facility])     Normal vaginal delivery    Other conditions influencing health status     Menstruation    Pap test, as part of routine gynecological examination 2022    Reflex Negative       Past Surgical History:   Procedure Laterality Date    FOOT SURGERY      TONSILLECTOMY         Past med hx and past surg hx reviewed and notable for: none    Review of Systems:   Constitutional: No fever or chills  Respiratory: No shortness of breath, or cough  Cardiovascular: No chest pain or syncope  Breasts: No breast pain, no masses, no nipple discharge  Gastrointestinal: No nausea, vomiting, or diarrhea, no abdominal pain  Genitourinary: No dysuria or frequency  Gynecology: Negative except as noted in history of present illness  All other: All other systems reviewed and negative for complaint    Objective   /72   Ht 1.6 m (5' 3\")   Wt 71.5 kg (157 lb 9.6 oz)   Breastfeeding Yes   BMI 27.92 kg/m²     PHYSICAL EXAMINATION:  Chaperone present for exam:  Nadya Johnson LPN  Well-developed, well nourished, in no acute distress, " alert and oriented x three, is pleasant and cooperative.   HEENT: Clear. Pupils equal, round and reactive to light and accommodation. Extraocular muscles are intact. Oral mucosa pink without exudate.   NECK: No lymphadenopathy, no thyromegaly.  BREASTS: Symmetric, no palpable masses. No nipple discharge or retraction.  LUNGS: Clear bilaterally.  HEART: Regular rate and rhythm without murmurs.  ABDOMEN: Normoactive bowel sounds, soft and nontender, no guarding or rebound tenderness, no CVA tenderness.  EXTREMITIES: No clubbing, cyanosis or edema.  NEUROLOGIC:  Cranial nerves II-XII grossly intact.  :  Normal external female genitalia, normal vulva, normal vagina. Normal urethral meatus, urethra and bladder. Normal appearing cervix. Normal-sized uterus, no adnexal masses or tenderness. Pap smear performed today.      Actions performed during this visit include:  - Clinical breast exam  - Clinical pelvic exam  - No orders of the defined types were placed in this encounter.       Problem List Items Addressed This Visit    None  Visit Diagnoses       Encounter for gynecological examination without abnormal finding        Relevant Orders    THINPREP PAP TEST    Encounter for screening for cervical cancer        Relevant Orders    THINPREP PAP TEST             Provider Impression:  1.  Annual  Patient will call when she wishes to go back on birth control pills.    Thank you for coming to your annual exam. Your findings during the exam were normal.  Please return for your next visit in 1 year.

## 2024-09-23 LAB
CYTOLOGY CMNT CVX/VAG CYTO-IMP: NORMAL
LAB AP HPV GENOTYPE QUESTION: YES
LAB AP HPV HR: NORMAL
LABORATORY COMMENT REPORT: NORMAL
MENSTRUAL HX REPORTED: NORMAL
PATH REPORT.TOTAL CANCER: NORMAL

## 2024-11-25 ENCOUNTER — TELEPHONE (OUTPATIENT)
Dept: OBSTETRICS AND GYNECOLOGY | Facility: CLINIC | Age: 34
End: 2024-11-25
Payer: COMMERCIAL

## 2024-11-25 DIAGNOSIS — Z30.41 ENCOUNTER FOR SURVEILLANCE OF CONTRACEPTIVE PILLS: Primary | ICD-10-CM

## 2024-11-25 RX ORDER — DROSPIRENONE AND ETHINYL ESTRADIOL 0.03MG-3MG
1 KIT ORAL DAILY
Qty: 28 TABLET | Refills: 11 | Status: SHIPPED | OUTPATIENT
Start: 2024-11-25 | End: 2025-11-25

## 2024-11-25 NOTE — TELEPHONE ENCOUNTER
Pt called stating she is no longer breast feeding and would like to start up on birth control. Pt states she would like the pill.      Thank you

## 2024-11-26 ENCOUNTER — TELEPHONE (OUTPATIENT)
Dept: OBSTETRICS AND GYNECOLOGY | Facility: CLINIC | Age: 34
End: 2024-11-26
Payer: COMMERCIAL

## 2024-11-26 NOTE — TELEPHONE ENCOUNTER
Pt called in today stating that her birth control was sent to the mail pharmacy. Pt states she is ok with waiting on that pack but for the future she would like it sent to [facility] in [place]. Let pt know to give us a call when she is almost done with that pack to then have it switched to the pharmacy she would perfer.

## 2024-12-27 ENCOUNTER — TELEPHONE (OUTPATIENT)
Dept: OBSTETRICS AND GYNECOLOGY | Facility: CLINIC | Age: 34
End: 2024-12-27
Payer: COMMERCIAL

## 2024-12-27 NOTE — TELEPHONE ENCOUNTER
Pt called needing her birth control sent to [facility] in [place]. Instead of doing the [facility] mail service

## 2024-12-29 DIAGNOSIS — Z30.41 ENCOUNTER FOR SURVEILLANCE OF CONTRACEPTIVE PILLS: ICD-10-CM

## 2024-12-29 RX ORDER — DROSPIRENONE AND ETHINYL ESTRADIOL 0.03MG-3MG
1 KIT ORAL DAILY
Qty: 28 TABLET | Refills: 11 | Status: SHIPPED | OUTPATIENT
Start: 2024-12-29 | End: 2025-12-29

## 2025-09-16 ENCOUNTER — APPOINTMENT (OUTPATIENT)
Dept: OBSTETRICS AND GYNECOLOGY | Facility: CLINIC | Age: 35
End: 2025-09-16
Payer: COMMERCIAL